# Patient Record
Sex: MALE | Race: WHITE | NOT HISPANIC OR LATINO | URBAN - METROPOLITAN AREA
[De-identification: names, ages, dates, MRNs, and addresses within clinical notes are randomized per-mention and may not be internally consistent; named-entity substitution may affect disease eponyms.]

---

## 2023-06-21 DIAGNOSIS — M25.532 LEFT WRIST PAIN: Primary | ICD-10-CM

## 2023-08-09 ENCOUNTER — OFFICE VISIT (OUTPATIENT)
Dept: ORTHOPEDICS | Facility: CLINIC | Age: 63
End: 2023-08-09

## 2023-08-09 VITALS
WEIGHT: 203 LBS | HEART RATE: 81 BPM | HEIGHT: 70 IN | SYSTOLIC BLOOD PRESSURE: 121 MMHG | BODY MASS INDEX: 29.06 KG/M2 | DIASTOLIC BLOOD PRESSURE: 77 MMHG

## 2023-08-09 DIAGNOSIS — M25.532 LEFT WRIST PAIN: ICD-10-CM

## 2023-08-09 DIAGNOSIS — M19.032 ARTHRITIS OF LEFT WRIST: ICD-10-CM

## 2023-08-09 DIAGNOSIS — M25.332 SCAPHOLUNATE DISSOCIATION, LEFT: Primary | ICD-10-CM

## 2023-08-09 PROCEDURE — 99203 OFFICE O/P NEW LOW 30 MIN: CPT | Mod: ,,, | Performed by: PHYSICIAN ASSISTANT

## 2023-08-09 PROCEDURE — 99203 PR OFFICE/OUTPT VISIT, NEW, LEVL III, 30-44 MIN: ICD-10-PCS | Mod: ,,, | Performed by: PHYSICIAN ASSISTANT

## 2023-08-09 RX ORDER — ESCITALOPRAM OXALATE 20 MG/1
TABLET ORAL
COMMUNITY
Start: 2023-07-17

## 2023-08-09 RX ORDER — ACETAMINOPHEN 500 MG
TABLET ORAL
COMMUNITY

## 2023-08-09 RX ORDER — MELOXICAM 15 MG/1
15 TABLET ORAL DAILY
Qty: 30 TABLET | Refills: 1 | Status: SHIPPED | OUTPATIENT
Start: 2023-08-09

## 2023-08-09 RX ORDER — ANASTROZOLE 1 MG/1
1 TABLET ORAL
COMMUNITY

## 2023-08-09 RX ORDER — AMLODIPINE AND BENAZEPRIL HYDROCHLORIDE 10; 20 MG/1; MG/1
CAPSULE ORAL
COMMUNITY
Start: 2023-07-17

## 2023-08-09 RX ORDER — MULTIVITAMIN
1 TABLET ORAL DAILY
COMMUNITY

## 2023-08-09 RX ORDER — ASPIRIN 81 MG/1
81 TABLET ORAL
COMMUNITY

## 2023-08-09 RX ORDER — SULFAMETHOXAZOLE AND TRIMETHOPRIM 800; 160 MG/1; MG/1
TABLET ORAL
COMMUNITY
Start: 2023-07-17

## 2023-08-10 NOTE — PROGRESS NOTES
"Chief Complaint:   Chief Complaint   Patient presents with    Hand Pain     L wrist.  states it has been swelling for about 6 month. felt like its been strained for about a year. thinks it has initally started when he was rising from the tub and his wrist went backwards. has tried wrapping with some relief. states the ice has been helping.        History of present illness:    This is a 63 y.o. year old male who complains of left wrist pain and stiffness.    Patient reports he has been having some pain and stiffness in his left wrist has been gradually worse.    Does not recall any specific injury or trauma but only thing he can recall is getting another bathtub 1 time pushed on his wrist and he has some pain but other than this no other incident has been called.    He is doing his having difficulty trying to do pushups or working out as his wrist pain and stiffness has been gradually increasing.    He has been using over-the-counter meds with some mild relief    Review of Systems:    Constitution:   Denies chills, fever, and sweats.  HENT:   Denies headaches or blurry vision.  Cardiovascular:  Denies chest pain or irregular heart beat.  Respiratory:   Denies cough or shortness of breath.  Gastrointestinal:  Denies abdominal pain, nausea, or vomiting.  Musculoskeletal:   Denies muscle cramps.  Neurological:   Denies dizziness or focal weakness.  Psychiatric/Behavior: Normal mental status.  Hematology/Lymph:  Denies bleeding problem or easy bruising/bleeding.  Skin:    Denies rash or suspicious lesions.    Examination:    Vital Signs:    Vitals:    08/09/23 1413   BP: 121/77   Pulse: 81   Weight: 92.1 kg (203 lb)   Height: 5' 10" (1.778 m)       Body mass index is 29.13 kg/m².    Constitution:   Well-developed, well nourished patient in no acute distress.  Neurological:   Alert and oriented x 3 and cooperative to examination.     Psychiatric/Behavior: Normal mental status.  Respiratory:   No shortness of " breath.  Eyes:    Extraoccular muscles intact  Skin:    No scars, rash or suspicious lesions.    Physical Exam:   Left wrist  No obvious deformity.  Negative tenderness over distal radius.  Supination and pronation to 90 degrees and 90 degrees, respectively.  Wrist flexion to 70 degrees and wrist extension to 40 degree.  Negative Tinel's test.  Negative Finkelstein's test.  4/5 strength, normal skin appearance and palpable pulses  Tenderness to palpation across the scapholunate junction.    Patient was tenderness palpation of the radioscaphoid junction.    Mild edema about the wrist    Imaging: X-rays reviewed from outside imaging source.    Patient does have a scapholunate dissociation with bone-on-bone of the scaphoid and radius.            Assessment: Scapholunate dissociation, left    Left wrist pain  -     Ambulatory referral/consult to Orthopedics    Arthritis of left wrist    Other orders  -     meloxicam (MOBIC) 15 MG tablet; Take 1 tablet (15 mg total) by mouth once daily. Take with food  Dispense: 30 tablet; Refill: 1         Plan:  At this point we will place the patient anti-inflammatory he will obtain a wrist brace to wear for comfort.    We will send an appointment to see Dr. Levy for more definitive treatment plan options but did discuss the option with the patient of using anti-inflammatories possible injections and bracing versus in possible proximal row carpectomy with fusion          DISCLAIMER: This note may have been dictated using voice recognition software and may contain grammatical errors.     NOTE: Consult report sent to referring provider via [employer].

## 2023-10-11 ENCOUNTER — OFFICE VISIT (OUTPATIENT)
Dept: ORTHOPEDICS | Facility: CLINIC | Age: 63
End: 2023-10-11

## 2023-10-11 VITALS
WEIGHT: 223.63 LBS | DIASTOLIC BLOOD PRESSURE: 89 MMHG | SYSTOLIC BLOOD PRESSURE: 144 MMHG | HEIGHT: 70 IN | HEART RATE: 89 BPM | BODY MASS INDEX: 32.02 KG/M2

## 2023-10-11 DIAGNOSIS — M19.132 SLAC (SCAPHOLUNATE ADVANCED COLLAPSE) OF WRIST, LEFT: Primary | ICD-10-CM

## 2023-10-11 DIAGNOSIS — M25.332 SCAPHOLUNATE DISSOCIATION, LEFT: ICD-10-CM

## 2023-10-11 PROCEDURE — 99203 PR OFFICE/OUTPT VISIT, NEW, LEVL III, 30-44 MIN: ICD-10-PCS | Mod: 25,,, | Performed by: STUDENT IN AN ORGANIZED HEALTH CARE EDUCATION/TRAINING PROGRAM

## 2023-10-11 PROCEDURE — 20605 DRAIN/INJ JOINT/BURSA W/O US: CPT | Mod: LT,,, | Performed by: STUDENT IN AN ORGANIZED HEALTH CARE EDUCATION/TRAINING PROGRAM

## 2023-10-11 PROCEDURE — 20605 INTERMEDIATE JOINT ASPIRATION/INJECTION: L RADIOCARPAL: ICD-10-PCS | Mod: LT,,, | Performed by: STUDENT IN AN ORGANIZED HEALTH CARE EDUCATION/TRAINING PROGRAM

## 2023-10-11 PROCEDURE — 99203 OFFICE O/P NEW LOW 30 MIN: CPT | Mod: 25,,, | Performed by: STUDENT IN AN ORGANIZED HEALTH CARE EDUCATION/TRAINING PROGRAM

## 2023-10-11 RX ADMIN — LIDOCAINE HYDROCHLORIDE 1 ML: 10 INJECTION INFILTRATION; PERINEURAL at 09:10

## 2023-10-11 RX ADMIN — BETAMETHASONE SODIUM PHOSPHATE AND BETAMETHASONE ACETATE 6 MG: 3; 3 INJECTION, SUSPENSION INTRA-ARTICULAR; INTRALESIONAL; INTRAMUSCULAR; SOFT TISSUE at 09:10

## 2023-10-20 RX ORDER — LIDOCAINE HYDROCHLORIDE 10 MG/ML
1 INJECTION INFILTRATION; PERINEURAL
Status: DISCONTINUED | OUTPATIENT
Start: 2023-10-11 | End: 2023-10-20 | Stop reason: HOSPADM

## 2023-10-20 RX ORDER — BETAMETHASONE SODIUM PHOSPHATE AND BETAMETHASONE ACETATE 3; 3 MG/ML; MG/ML
6 INJECTION, SUSPENSION INTRA-ARTICULAR; INTRALESIONAL; INTRAMUSCULAR; SOFT TISSUE
Status: DISCONTINUED | OUTPATIENT
Start: 2023-10-11 | End: 2023-10-20 | Stop reason: HOSPADM

## 2023-10-20 NOTE — PROCEDURES
Intermediate Joint Aspiration/Injection: L radiocarpal    Date/Time: 10/11/2023 9:30 AM    Performed by: Osbaldo Levy MD  Authorized by: Osbaldo Levy MD    Consent Done?:  Yes (Verbal)  Indications:  Arthritis  Timeout: Prior to procedure the correct patient, procedure, and site was verified      Location:  Wrist  Site:  L radiocarpal  Ultrasonic Guidance for needle placement: No  Needle size:  25 G  Approach:  Dorsal  Medications:  1 mL LIDOcaine HCL 10 mg/ml (1%) 10 mg/mL (1 %); 6 mg betamethasone acetate-betamethasone sodium phosphate 6 mg/mL  Patient tolerance:  Patient tolerated the procedure well with no immediate complications

## 2023-10-20 NOTE — PROGRESS NOTES
Chief Complaint:  Left wrist pain    Consulting Physician: No ref. 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  It has been getting worse over the last several years.  He states that he has been wearing a brace which gives him some relief but does not alleviate his pain.  He did not remember any injury that he can attribute this pain 2.  He has not had an injection for this.  He has not had any surgery for this.  He localizes the pain to the radiocarpal joint.  It is worse with motion.    History reviewed. No pertinent past medical history.    Past Surgical History:   Procedure Laterality Date    VASECTOMY  2008       Current Outpatient Medications   Medication Sig    amlodipine-benazepril 10-20mg (LOTREL) 10-20 mg per capsule TAKE ONE (1) CAPSULE(S) BY MOUTH EVERY MORNING.    anastrozole (ARIMIDEX) 1 mg Tab Take 1 mg by mouth.    ARMOUR THYROID 60 mg Tab TAKE ONE (1) TABLET(S) BY MOUTH DAILY IN THE MORNING ON AN EMPTY STOMACH.    aspirin (ECOTRIN) 81 MG EC tablet Take 81 mg by mouth.    cholecalciferol, vitamin D3, (VITAMIN D3) 50 mcg (2,000 unit) Cap capsule Take by mouth.    EScitalopram oxalate (LEXAPRO) 20 MG tablet TAKE ONE (1) TABLET(S) BY MOUTH ONCE A DAY.    multivitamin with folic acid 400 mcg Tab Take 1 tablet by mouth once daily.    meloxicam (MOBIC) 15 MG tablet Take 1 tablet (15 mg total) by mouth once daily. Take with food (Patient not taking: Reported on 10/11/2023)     No current facility-administered medications for this visit.       Review of patient's allergies indicates:  No Known Allergies    Family History   Problem Relation Age of Onset    Cancer Mother     Arthritis Father        Social History     Socioeconomic History    Marital status:    Tobacco Use    Smoking status: Never    Smokeless tobacco: Never   Substance and Sexual  "Activity    Alcohol use: Yes     Alcohol/week: 7.0 standard drinks of alcohol     Types: 7 Drinks containing 0.5 oz of alcohol per week    Drug use: Never    Sexual activity: Yes     Partners: Female     Birth control/protection: Partner-Vasectomy       Review of Systems:    Constitution:   Denies chills, fever, and sweats.  HENT:   Denies headaches or blurry vision.  Cardiovascular:  Denies chest pain or irregular heart beat.  Respiratory:   Denies cough or shortness of breath.  Gastrointestinal:  Denies abdominal pain, nausea, or vomiting.  Musculoskeletal:   Denies muscle cramps.  Neurological:   Denies dizziness or focal weakness.  Psychiatric/Behavior: Normal mental status.  Hematology/Lymph:  Denies bleeding problem or easy bruising/bleeding.  Skin:    Denies rash or suspicious lesions.    Examination:    Vital Signs:    Vitals:    10/11/23 0941   BP: (!) 144/89   Pulse: 89   Weight: 101.4 kg (223 lb 9.6 oz)   Height: 5' 10" (1.778 m)       Body mass index is 32.08 kg/m².    Constitution:   Well-developed, well nourished patient in no acute distress.  Neurological:   Alert and oriented x 3 and cooperative to examination.     Psychiatric/Behavior: Normal mental status.  Respiratory:   No shortness of breath.  Eyes:    Extraoccular muscles intact  Skin:    No scars, rash or suspicious lesions.    MSK:   Left wrist:  No open wounds or rashes.  Mild swelling over the radial aspect of the radiocarpal joint.  Tenderness to palpation over the radioscaphoid articulation.  Wrist range of motion is 30° of extension, 30° of flexion, full pro supination, 10° of radial deviation, 40° of ulnar deviation.  He can make a fist and extend his digits.  Sensation light touch intact in median ulnar and radial distribution.  Radial pulse 2 +hand is warm well perfused    Imaging:   X-ray of the left wrist shows SLAC wrist with scapholunate widening and radioscaphoid arthritis     Assessment:  Left SLAC wrist    Plan:  Went over " different treatment options with him including conservative options that we can try 1st.  He can continue wearing the brace.  I will also give him an injection into the radiocarpal joint today since he has never had 1.  I also discussed proximal row carpectomy with him which would be the salvage operation for this wrist if he wants operative treatment.  He can see how this injection works for him and see me back in 3 months to discuss another injection versus surgery    Follow Up:  3 months  Xray at next visit:  Left wrist